# Patient Record
Sex: MALE | Race: WHITE | NOT HISPANIC OR LATINO
[De-identification: names, ages, dates, MRNs, and addresses within clinical notes are randomized per-mention and may not be internally consistent; named-entity substitution may affect disease eponyms.]

---

## 2021-10-11 PROBLEM — Z00.00 ENCOUNTER FOR PREVENTIVE HEALTH EXAMINATION: Status: ACTIVE | Noted: 2021-10-11

## 2021-12-10 ENCOUNTER — NON-APPOINTMENT (OUTPATIENT)
Age: 76
End: 2021-12-10

## 2021-12-14 ENCOUNTER — APPOINTMENT (OUTPATIENT)
Dept: NEUROSURGERY | Facility: CLINIC | Age: 76
End: 2021-12-14
Payer: MEDICARE

## 2021-12-14 DIAGNOSIS — S06.5X9A TRAUMATIC SUBDURAL HEMORRHAGE WITH LOSS OF CONSCIOUSNESS OF UNSPECIFIED DURATION, INITIAL ENCOUNTER: ICD-10-CM

## 2021-12-14 PROCEDURE — 99205 OFFICE O/P NEW HI 60 MIN: CPT

## 2021-12-14 NOTE — HISTORY OF PRESENT ILLNESS
[de-identified] : Mr. Moss presents in neurosurgical consultation at the request of Dr. Emeterio Vegas with his wife in attendance. He has a PMHx of DM II, HTN, vitamin D deficiency, hypothyroidism, chronic kidney disease stage 3, hyperuricosuria, and bipolar disorder. On 11/24 , patient states he was being evaluated at an urgent care for progressive cough and wheezing. While he was waiting to be seen and tripped outside over the curb and ultimately struck his head. Denies loss of consciousness at the time of the event. Noncontrast CT head demonstrated left temporal lobe contrecoup contusion and possible traumatic subarachnoid hemorrhage. He was referred to [facility] at that time for further evaluation and was discharged with scheduled follow up. Patient was seen by his primary care physician Dr. Emeterio Vegas, which prompted a repeat noncontrast CT head and MRI brain without contrast on 12/9, detailed below.\par \par Today the patient states headaches have completely resolved. He denies visual disturbances, urinary/bladder incontinence, or gait instability.

## 2021-12-14 NOTE — ASSESSMENT
[FreeTextEntry1] : Mr. Moss presents in neurosurgical consultation status post traumatic fall with no loss of consciousness. He is currently asymptomatic. 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 Alternative management strategies reviewed, including continued observation with serial imaging, surgical evacuation, endovascular transarterial middle meningeal embolization with attendant risks and benefits of each approach. Given the small volume of the fluid collections and the complete lack of any associate symptoms, I have recommended surveillance CT head in 2 weeks with an appointment to follow. I did explain that in the event of interval progression, intervention will be recommended. We discussed the relative risks of surgical evacuation and of endovascular middle meningeal artery embolization. If the patient remains asymptomatic and there is evidence for radiographic progression, then I feel he would be an excellent candidate for endovascular intervention. Given his history of renal failure, we would need to optimize renal function periprocedurally with Dr. Vegas and potentially nephrology prior to proceeding, or consider surgical evacuation as an alternative. The patient and his wife understand the recommendations and the issues at hand. \par \par I have asked the patient and his wife  to contact me for any symptomatic development or progression in the interim at which time we can obtain expedited follow up imaging.\par \par A total of 60 minutes were spent relative to this encounter.\par \par \par

## 2021-12-14 NOTE — DATA REVIEWED
[de-identified] : CT HEAD WITHOUT CONTRAST: 12/9/2021: IMPRESSION: New bilateral hypodense frontoparietal convexity subdural collections suggesting posttraumatic subdural hygromas. Interval resolved mild hemorrhagic changes left temporal lobe region. \par CT HEAD WITHOUT CONTRAST: 11/24/21: IMPRESSION - Mild hyperdensity left anterior temporal lobe suggestive of left temporal lobe countercoup hemorrhagic contusion and possible small subarachnoid hemorrhage. Prominent right supraorbital extacalvanial soft tissue hematoma. Prominent paranasal sins disease including air-fluid levels bilateral maxillary sinuses likely to acute sinusitis. Visualized calvarium and limited visualization facial bones appears intact.   [de-identified] : MRI BRAIN WITHOUT CONTRAST : 12/9/2021: IMPRESSION- Bilateral convexity subdural collections. The extent of hemosiderin staining within the collections is atypical and not consistent with simple subdural hygromas. These may represent combination of subdural hygromas and subacute subdural hematomas. No significant mass effect.

## 2021-12-14 NOTE — PHYSICAL EXAM
[General Appearance - Alert] : alert [General Appearance - In No Acute Distress] : in no acute distress [General Appearance - Well Nourished] : well nourished [Oriented To Time, Place, And Person] : oriented to person, place, and time [Impaired Insight] : insight and judgment were intact [Affect] : the affect was normal [Cranial Nerves Optic (II)] : visual acuity intact bilaterally,  pupils equal round and reactive to light [Cranial Nerves Oculomotor (III)] : extraocular motion intact [Cranial Nerves Trigeminal (V)] : facial sensation intact symmetrically [Cranial Nerves Facial (VII)] : face symmetrical [Cranial Nerves Vestibulocochlear (VIII)] : hearing was intact bilaterally [Cranial Nerves Glossopharyngeal (IX)] : tongue and palate midline [Cranial Nerves Accessory (XI - Cranial And Spinal)] : head turning and shoulder shrug symmetric [Cranial Nerves Hypoglossal (XII)] : there was no tongue deviation with protrusion [Motor Tone] : muscle tone was normal in all four extremities [Motor Strength] : muscle strength was normal in all four extremities [Sensation Tactile Decrease] : light touch was intact [Abnormal Walk] : normal gait [Balance] : balance was intact [FreeTextEntry1] : Right periorbital ecchymosis

## 2021-12-15 PROBLEM — S06.5X9A SUBDURAL HEMATOMA: Status: ACTIVE | Noted: 2021-12-15

## 2021-12-28 ENCOUNTER — APPOINTMENT (OUTPATIENT)
Dept: NEPHROLOGY | Facility: CLINIC | Age: 76
End: 2021-12-28

## 2021-12-31 ENCOUNTER — NON-APPOINTMENT (OUTPATIENT)
Age: 76
End: 2021-12-31

## 2022-04-13 ENCOUNTER — NON-APPOINTMENT (OUTPATIENT)
Age: 77
End: 2022-04-13

## 2022-11-11 ENCOUNTER — OFFICE (OUTPATIENT)
Dept: URBAN - METROPOLITAN AREA CLINIC 121 | Facility: CLINIC | Age: 77
Setting detail: OPHTHALMOLOGY
End: 2022-11-11
Payer: MEDICARE

## 2022-11-11 DIAGNOSIS — H25.13: ICD-10-CM

## 2022-11-11 DIAGNOSIS — H35.363: ICD-10-CM

## 2022-11-11 DIAGNOSIS — H02.401: ICD-10-CM

## 2022-11-11 DIAGNOSIS — H16.223: ICD-10-CM

## 2022-11-11 DIAGNOSIS — H43.393: ICD-10-CM

## 2022-11-11 DIAGNOSIS — H43.813: ICD-10-CM

## 2022-11-11 DIAGNOSIS — H35.033: ICD-10-CM

## 2022-11-11 DIAGNOSIS — E11.9: ICD-10-CM

## 2022-11-11 DIAGNOSIS — H40.013: ICD-10-CM

## 2022-11-11 PROCEDURE — 92250 FUNDUS PHOTOGRAPHY W/I&R: CPT | Performed by: OPHTHALMOLOGY

## 2022-11-11 PROCEDURE — 92014 COMPRE OPH EXAM EST PT 1/>: CPT | Performed by: OPHTHALMOLOGY

## 2022-11-11 ASSESSMENT — KERATOMETRY
METHOD_AUTO_MANUAL: AUTO
OS_K2POWER_DIOPTERS: 41.25
OS_AXISANGLE_DEGREES: 010
OD_AXISANGLE_DEGREES: 139
OD_K2POWER_DIOPTERS: 41.00
OS_K1POWER_DIOPTERS: 40.50
OD_K1POWER_DIOPTERS: 40.75

## 2022-11-11 ASSESSMENT — REFRACTION_AUTOREFRACTION
OD_AXIS: 002
OS_CYLINDER: +0.75
OS_AXIS: 006
OD_CYLINDER: +0.25
OS_SPHERE: -0.25
OD_SPHERE: +0.50

## 2022-11-11 ASSESSMENT — CONFRONTATIONAL VISUAL FIELD TEST (CVF)
OD_FINDINGS: FULL
OS_FINDINGS: FULL

## 2022-11-11 ASSESSMENT — TONOMETRY
OD_IOP_MMHG: 16
OS_IOP_MMHG: 16

## 2022-11-11 ASSESSMENT — SPHEQUIV_DERIVED
OD_SPHEQUIV: 0.625
OS_SPHEQUIV: 0.125

## 2022-11-11 ASSESSMENT — AXIALLENGTH_DERIVED
OD_AL: 24.3353
OS_AL: 24.5443

## 2022-11-11 ASSESSMENT — SUPERFICIAL PUNCTATE KERATITIS (SPK)
OD_SPK: 1+
OS_SPK: 1+

## 2022-11-11 ASSESSMENT — PACHYMETRY
OS_CT_UM: 641
OS_CT_CORRECTION: -7

## 2022-11-11 ASSESSMENT — LID POSITION - PTOSIS: OD_PTOSIS: 2+

## 2022-11-11 ASSESSMENT — VISUAL ACUITY
OD_BCVA: 20/50
OS_BCVA: 20/40